# Patient Record
Sex: MALE | Race: WHITE | Employment: OTHER | URBAN - METROPOLITAN AREA
[De-identification: names, ages, dates, MRNs, and addresses within clinical notes are randomized per-mention and may not be internally consistent; named-entity substitution may affect disease eponyms.]

---

## 2017-03-22 ENCOUNTER — OFFICE VISIT (OUTPATIENT)
Dept: CARDIOLOGY CLINIC | Age: 72
End: 2017-03-22

## 2017-03-22 VITALS
DIASTOLIC BLOOD PRESSURE: 80 MMHG | HEART RATE: 60 BPM | WEIGHT: 186 LBS | SYSTOLIC BLOOD PRESSURE: 120 MMHG | BODY MASS INDEX: 29.13 KG/M2

## 2017-03-22 DIAGNOSIS — Z98.61 POST PTCA: ICD-10-CM

## 2017-03-22 DIAGNOSIS — I25.10 CAD IN NATIVE ARTERY: Primary | ICD-10-CM

## 2017-03-22 DIAGNOSIS — I10 ESSENTIAL HYPERTENSION: ICD-10-CM

## 2017-03-22 PROCEDURE — 99214 OFFICE O/P EST MOD 30 MIN: CPT | Performed by: INTERNAL MEDICINE

## 2017-08-30 ENCOUNTER — OFFICE VISIT (OUTPATIENT)
Dept: CARDIOLOGY CLINIC | Age: 72
End: 2017-08-30

## 2017-08-30 VITALS
HEART RATE: 64 BPM | BODY MASS INDEX: 28.91 KG/M2 | WEIGHT: 184.6 LBS | DIASTOLIC BLOOD PRESSURE: 72 MMHG | SYSTOLIC BLOOD PRESSURE: 134 MMHG

## 2017-08-30 DIAGNOSIS — I25.10 CAD IN NATIVE ARTERY: Primary | ICD-10-CM

## 2017-08-30 DIAGNOSIS — I10 ESSENTIAL HYPERTENSION: ICD-10-CM

## 2017-08-30 DIAGNOSIS — Z98.61 POST PTCA: ICD-10-CM

## 2017-08-30 PROCEDURE — 99214 OFFICE O/P EST MOD 30 MIN: CPT | Performed by: INTERNAL MEDICINE

## 2018-06-27 ENCOUNTER — OFFICE VISIT (OUTPATIENT)
Dept: CARDIOLOGY CLINIC | Age: 73
End: 2018-06-27

## 2018-06-27 VITALS
DIASTOLIC BLOOD PRESSURE: 68 MMHG | BODY MASS INDEX: 27.97 KG/M2 | HEART RATE: 64 BPM | WEIGHT: 178.6 LBS | SYSTOLIC BLOOD PRESSURE: 114 MMHG

## 2018-06-27 DIAGNOSIS — I25.10 CAD IN NATIVE ARTERY: Primary | ICD-10-CM

## 2018-06-27 DIAGNOSIS — E78.5 HYPERLIPIDEMIA, UNSPECIFIED HYPERLIPIDEMIA TYPE: ICD-10-CM

## 2018-06-27 DIAGNOSIS — Z98.61 HISTORY OF PTCA: ICD-10-CM

## 2018-06-27 DIAGNOSIS — I10 ESSENTIAL HYPERTENSION: ICD-10-CM

## 2018-06-27 PROCEDURE — 99214 OFFICE O/P EST MOD 30 MIN: CPT | Performed by: INTERNAL MEDICINE

## 2018-12-13 ENCOUNTER — OFFICE VISIT (OUTPATIENT)
Dept: CARDIOLOGY CLINIC | Age: 73
End: 2018-12-13
Payer: MEDICARE

## 2018-12-13 VITALS
SYSTOLIC BLOOD PRESSURE: 138 MMHG | DIASTOLIC BLOOD PRESSURE: 60 MMHG | WEIGHT: 181.8 LBS | BODY MASS INDEX: 28.47 KG/M2 | HEART RATE: 60 BPM

## 2018-12-13 DIAGNOSIS — Z98.61 HISTORY OF PTCA: ICD-10-CM

## 2018-12-13 DIAGNOSIS — E78.5 HYPERLIPIDEMIA, UNSPECIFIED HYPERLIPIDEMIA TYPE: ICD-10-CM

## 2018-12-13 DIAGNOSIS — I10 ESSENTIAL HYPERTENSION: ICD-10-CM

## 2018-12-13 DIAGNOSIS — I25.10 CAD IN NATIVE ARTERY: Primary | ICD-10-CM

## 2018-12-13 PROCEDURE — 99214 OFFICE O/P EST MOD 30 MIN: CPT | Performed by: INTERNAL MEDICINE

## 2018-12-13 NOTE — PROGRESS NOTES
Subjective:      Patient ID: Eliezer Cohen is a 75 y.o. male. HPI: Here for 6 month follow up. Follow up for CAD/PTCA/HTN. Active. Exercising 2x per wt. No exertional sx. No chest pain/no sob. No edema. No pnd. No tachyc/syncope. No pnd or orthopnea. Past Medical History:   Diagnosis Date    CAD (coronary artery disease)     Hyperlipemia     Hypertension     Trigger finger, left Sept 2011    Dr Mario Alberto Bolaños    Type II or unspecified type diabetes mellitus without mention of complication, not stated as uncontrolled      Past Surgical History:   Procedure Laterality Date    CARDIOVASCULAR STRESS TEST  Feb 2009    Dr Rudolph Jenkins  nov 2011    neg, Dr Pattie Faye, EF 65%.  COLONOSCOPY  Aug 2004    Dr Isabel Tesfaye  Feb 2004    Dr Charles Garcia  2002 & 2003   [facility]ERNIA REPAIR  Sept 2001    right side    UPPER GASTROINTESTINAL ENDOSCOPY  Nov 2007    Dr Lawrence Rios       No Known Allergies     Social History     Social History    Marital status:      Spouse name: N/A    Number of children: 2    Years of education: N/A     Occupational History          retired     Social History Main Topics    Smoking status: Former Smoker     Packs/day: 2.00     Years: 12.00     Quit date: 2/28/1965    Smokeless tobacco: Never Used    Alcohol use No    Drug use: No    Sexual activity: Not on file     Other Topics Concern    Not on file     Social History Narrative    No narrative on file        Patient has a family history includes Coronary Art Dis in his father; Diabetes in his brother and mother; Hypertension in his brother and father. Patient  has a past medical history of CAD (coronary artery disease); Hyperlipemia; Hypertension; Trigger finger, left; and Type II or unspecified type diabetes mellitus without mention of complication, not stated as uncontrolled.      Current Outpatient Prescriptions

## 2018-12-26 ENCOUNTER — HOSPITAL ENCOUNTER (OUTPATIENT)
Dept: NON INVASIVE DIAGNOSTICS | Age: 73
Discharge: HOME | End: 2018-12-26
Payer: MEDICARE

## 2018-12-26 ENCOUNTER — HOSPITAL ENCOUNTER (OUTPATIENT)
Dept: NUCLEAR MEDICINE | Age: 73
Discharge: HOME | End: 2018-12-26
Payer: MEDICARE

## 2018-12-26 DIAGNOSIS — I10 ESSENTIAL HYPERTENSION: ICD-10-CM

## 2018-12-26 DIAGNOSIS — Z98.61 HISTORY OF PTCA: ICD-10-CM

## 2018-12-26 DIAGNOSIS — E78.5 HYPERLIPIDEMIA, UNSPECIFIED HYPERLIPIDEMIA TYPE: ICD-10-CM

## 2018-12-26 DIAGNOSIS — I25.10 CAD IN NATIVE ARTERY: ICD-10-CM

## 2018-12-26 LAB
LV EF: 57 %
LVEF MODALITY: NORMAL

## 2018-12-26 PROCEDURE — 93017 CV STRESS TEST TRACING ONLY: CPT

## 2018-12-26 PROCEDURE — A9502 TC99M TETROFOSMIN: HCPCS | Performed by: INTERNAL MEDICINE

## 2018-12-26 PROCEDURE — 2580000003 HC RX 258: Performed by: INTERNAL MEDICINE

## 2018-12-26 PROCEDURE — 3430000000 HC RX DIAGNOSTIC RADIOPHARMACEUTICAL: Performed by: INTERNAL MEDICINE

## 2018-12-26 PROCEDURE — 78452 HT MUSCLE IMAGE SPECT MULT: CPT

## 2018-12-26 RX ORDER — SODIUM CHLORIDE 0.9 % (FLUSH) 0.9 %
10 SYRINGE (ML) INJECTION PRN
Status: DISCONTINUED | OUTPATIENT
Start: 2018-12-26 | End: 2018-12-27 | Stop reason: HOSPADM

## 2018-12-26 RX ADMIN — Medication 10 ML: at 09:34

## 2018-12-26 RX ADMIN — TETROFOSMIN 30 MILLICURIE: 1.38 INJECTION, POWDER, LYOPHILIZED, FOR SOLUTION INTRAVENOUS at 10:51

## 2018-12-26 RX ADMIN — TETROFOSMIN 10 MILLICURIE: 1.38 INJECTION, POWDER, LYOPHILIZED, FOR SOLUTION INTRAVENOUS at 09:34

## 2018-12-26 RX ADMIN — Medication 10 ML: at 10:51

## 2019-06-20 ENCOUNTER — OFFICE VISIT (OUTPATIENT)
Dept: CARDIOLOGY CLINIC | Age: 74
End: 2019-06-20
Payer: MEDICARE

## 2019-06-20 VITALS
DIASTOLIC BLOOD PRESSURE: 70 MMHG | WEIGHT: 188 LBS | HEART RATE: 60 BPM | BODY MASS INDEX: 29.44 KG/M2 | SYSTOLIC BLOOD PRESSURE: 130 MMHG

## 2019-06-20 DIAGNOSIS — Z98.61 HISTORY OF PTCA: ICD-10-CM

## 2019-06-20 DIAGNOSIS — E78.5 HYPERLIPIDEMIA, UNSPECIFIED HYPERLIPIDEMIA TYPE: ICD-10-CM

## 2019-06-20 DIAGNOSIS — I25.10 CAD IN NATIVE ARTERY: Primary | ICD-10-CM

## 2019-06-20 DIAGNOSIS — I10 ESSENTIAL HYPERTENSION: ICD-10-CM

## 2019-06-20 PROCEDURE — 99214 OFFICE O/P EST MOD 30 MIN: CPT | Performed by: INTERNAL MEDICINE

## 2019-06-20 NOTE — PROGRESS NOTES
Subjective:      Patient ID: Charlotte Simms is a 75 y.o. male. HPI: Here for 6 month follow up. Follow up for CAD/PTCA/HTN. Active. No exertional sx. No chest pain/no sob. No edema. No pnd. No tachyc/syncope. No pnd or orthopnea. BP at home good         Past Medical History:   Diagnosis Date    CAD (coronary artery disease)     Hyperlipemia     Hypertension     Trigger finger, left 2011    Dr Orville Handy    Type II or unspecified type diabetes mellitus without mention of complication, not stated as uncontrolled      Past Surgical History:   Procedure Laterality Date    CARDIOVASCULAR STRESS TEST  2009    Dr Digna Woody  2011    neg, Dr Lana Hobbs, EF 65%.     COLONOSCOPY  Aug 2004    Dr Remi Donovan  2004    Dr Danial Vargas   &    [facility] HERNIA REPAIR  2001    right side    UPPER GASTROINTESTINAL ENDOSCOPY  2007    Dr Christopher Cloud       No Known Allergies     Social History     Socioeconomic History    Marital status:      Spouse name: Not on file    Number of children: 2    Years of education: Not on file    Highest education level: Not on file   Occupational History    Occupation: [employer]     Comment: retired   Social Needs    Financial resource strain: Not on file    Food insecurity:     Worry: Not on file     Inability: Not on file   [place] needs:     Medical: Not on file     Non-medical: Not on file   Tobacco Use    Smoking status: Former Smoker     Packs/day: 2.00     Years: 12.00     Pack years: 24.00     Last attempt to quit: 1965     Years since quittin.3    Smokeless tobacco: Never Used   Substance and Sexual Activity    Alcohol use: No    Drug use: No    Sexual activity: Not on file   Lifestyle    Physical activity:     Days per week: Not on file     Minutes per session: Not on file    Stress: Not on file   Relationships    Social connections:     Talks on phone: Not on file     Gets together: Not on file     Attends Jain service: Not on file     Active member of club or organization: Not on file     Attends meetings of clubs or organizations: Not on file     Relationship status: Not on file    Intimate partner violence:     Fear of current or ex partner: Not on file     Emotionally abused: Not on file     Physically abused: Not on file     Forced sexual activity: Not on file   Other Topics Concern    Not on file   Social History Narrative    Not on file        Patient has a family history includes Coronary Art Dis in his father; Diabetes in his brother and mother; Hypertension in his brother and father. Patient  has a past medical history of CAD (coronary artery disease), Hyperlipemia, Hypertension, Trigger finger, left, and Type II or unspecified type diabetes mellitus without mention of complication, not stated as uncontrolled. Current Outpatient Medications   Medication Sig Dispense Refill    Multiple Vitamins-Minerals (ICAPS AREDS 2 PO) Take by mouth 2 times daily      Cholecalciferol (VITAMIN D3) 5000 UNITS TABS Take by mouth      ONETOUCH DELICA LANCETS 90J MISC USE LANCET TO TEST TWICE A DAY AS DIRECTED 200 each 2    ONE TOUCH ULTRA TEST strip USE TO TEST BLOOD SUGAR TWO TIMES A DAY AS DIRECTED 100 strip 3    omeprazole (PRILOSEC) 20 MG capsule Take 20 mg by mouth daily.  metFORMIN (GLUCOPHAGE) 500 MG tablet       tamsulosin (FLOMAX) 0.4 MG capsule       BD PEN NEEDLE MAXINE U/F 32G X 4 MM MISC USE TWICE A  each 3    atorvastatin (LIPITOR) 40 MG tablet Take 1 tablet by mouth daily.  (Patient taking differently: Take 80 mg by mouth daily ) 30 tablet 6    KLOR-CON M20 20 MEQ tablet TAKE ONE TABLET BY MOUTH EVERY DAY 90 tablet 0    amLODIPine (NORVASC) 5 MG tablet TAKE ONE TABLET BY MOUTH EVERY DAY 90 tablet 3    lisinopril-hydrochlorothiazide (PRINZIDE;ZESTORETIC) 20-12.5 MG per tablet TAKE ONE TABLET edema and no tenderness. Neurological: He is alert and oriented to person, place, and time. Skin: Skin is warm and dry. Psychiatric: He has a normal mood and affect. His behavior is normal. Thought content normal.       Assessment:      1. CAD (coronary artery disease)    2. Post PTCA    3. HTN (hypertension)    4. Hyperlipidemia          Plan:      CV stable. Having no angina. BP is good. Wt stable. Remains active. Feeling good. No changes. Reviewed previous records and testing including myoview 12/18. Continue to monitor. Follow up 6 months. Lipids per  PCP.

## 2020-01-08 ENCOUNTER — OFFICE VISIT (OUTPATIENT)
Dept: CARDIOLOGY CLINIC | Age: 75
End: 2020-01-08
Payer: MEDICARE

## 2020-01-08 VITALS
DIASTOLIC BLOOD PRESSURE: 70 MMHG | SYSTOLIC BLOOD PRESSURE: 116 MMHG | BODY MASS INDEX: 29.6 KG/M2 | HEART RATE: 60 BPM | WEIGHT: 189 LBS

## 2020-01-08 PROCEDURE — 99214 OFFICE O/P EST MOD 30 MIN: CPT | Performed by: INTERNAL MEDICINE

## 2020-01-08 RX ORDER — METFORMIN HYDROCHLORIDE 500 MG/1
500 TABLET, EXTENDED RELEASE ORAL 2 TIMES DAILY
COMMUNITY
Start: 2019-12-11

## 2020-01-08 NOTE — PROGRESS NOTES
Not on file     Minutes per session: Not on file    Stress: Not on file   Relationships    Social connections:     Talks on phone: Not on file     Gets together: Not on file     Attends Taoism service: Not on file     Active member of club or organization: Not on file     Attends meetings of clubs or organizations: Not on file     Relationship status: Not on file    Intimate partner violence:     Fear of current or ex partner: Not on file     Emotionally abused: Not on file     Physically abused: Not on file     Forced sexual activity: Not on file   Other Topics Concern    Not on file   Social History Narrative    Not on file        Patient has a family history includes Coronary Art Dis in his father; Diabetes in his brother and mother; Hypertension in his brother and father. Patient  has a past medical history of CAD (coronary artery disease), Hyperlipemia, Hypertension, Trigger finger, left, and Type II or unspecified type diabetes mellitus without mention of complication, not stated as uncontrolled. Current Outpatient Medications   Medication Sig Dispense Refill    metFORMIN (GLUCOPHAGE-XR) 500 MG extended release tablet Take 500 mg by mouth      Cholecalciferol (VITAMIN D3) 5000 UNITS TABS Take by mouth      ONETOUCH DELICA LANCETS 87N MISC USE LANCET TO TEST TWICE A DAY AS DIRECTED 200 each 2    ONE TOUCH ULTRA TEST strip USE TO TEST BLOOD SUGAR TWO TIMES A DAY AS DIRECTED 100 strip 3    omeprazole (PRILOSEC) 20 MG capsule Take 20 mg by mouth daily.  tamsulosin (FLOMAX) 0.4 MG capsule       BD PEN NEEDLE MAXINE U/F 32G X 4 MM MISC USE TWICE A  each 3    atorvastatin (LIPITOR) 40 MG tablet Take 1 tablet by mouth daily.  (Patient taking differently: Take 80 mg by mouth daily ) 30 tablet 6    KLOR-CON M20 20 MEQ tablet TAKE ONE TABLET BY MOUTH EVERY DAY 90 tablet 0    amLODIPine (NORVASC) 5 MG tablet TAKE ONE TABLET BY MOUTH EVERY DAY 90 tablet 3    lisinopril-hydrochlorothiazide (PRINZIDE;ZESTORETIC) 20-12.5 MG per tablet TAKE ONE TABLET BY MOUTH EVERY DAY 90 tablet 3    metoprolol (LOPRESSOR) 25 MG tablet TAKE ONE TABLET BY MOUTH TWICE A  tablet 3    insulin detemir (LEVEMIR FLEXPEN) 100 UNIT/ML injection Inject 40 Units into the skin 2 times daily. 10 Pen 6    aspirin 325 MG tablet Take 81 mg by mouth daily.  Multiple Vitamins-Minerals (ICAPS AREDS 2 PO) Take by mouth 2 times daily      metFORMIN (GLUCOPHAGE) 500 MG tablet       folic acid (FOLVITE) 072 MCG tablet Take 800 mcg by mouth daily. No current facility-administered medications for this visit. Vitals:    01/08/20 1310   BP: 116/70   Pulse: 60       Wt 189      Review of Systems   Constitutional: Negative for activity change and fatigue. Respiratory: Negative for chest tightness. Negative for apnea, cough, choking and shortness of breath. Cardiovascular: Negative for chest pain, palpitations and leg swelling. No PND or orthopnea. No tachycardia. Gastrointestinal: Negative for abdominal distention. Musculoskeletal: Negative for myalgias. Neurological: Negative for dizziness, syncope and light-headedness. Psychiatric/Behavioral: Negative for behavioral problems, confusion and agitation. All other systems reviewed negative as done    Objective:   Physical Exam   Constitutional: He is oriented to person, place, and time. He appears well-developed and well-nourished. No distress. HENT:   Head: Normocephalic and atraumatic. Eyes: Conjunctivae and EOM are normal. Right eye exhibits no discharge. Left eye exhibits no discharge. Neck: Normal range of motion. Neck supple. No JVD present. Cardiovascular: Normal rate, regular rhythm, S1 normal, S2 normal and normal heart sounds. Exam reveals no gallop. No murmur heard. Pulses:       Radial pulses are 2+ on the right side, and 2+ on the left side.    Pulmonary/Chest: Effort normal and breath sounds normal. No respiratory

## 2021-02-10 ENCOUNTER — OFFICE VISIT (OUTPATIENT)
Dept: CARDIOLOGY CLINIC | Age: 76
End: 2021-02-10
Payer: MEDICARE

## 2021-02-10 VITALS
HEART RATE: 54 BPM | HEIGHT: 67 IN | DIASTOLIC BLOOD PRESSURE: 86 MMHG | WEIGHT: 184 LBS | BODY MASS INDEX: 28.88 KG/M2 | SYSTOLIC BLOOD PRESSURE: 144 MMHG | TEMPERATURE: 97.8 F

## 2021-02-10 DIAGNOSIS — I10 ESSENTIAL HYPERTENSION: ICD-10-CM

## 2021-02-10 DIAGNOSIS — E78.5 HYPERLIPIDEMIA, UNSPECIFIED HYPERLIPIDEMIA TYPE: ICD-10-CM

## 2021-02-10 DIAGNOSIS — Z98.61 HISTORY OF PTCA: ICD-10-CM

## 2021-02-10 DIAGNOSIS — I25.10 CAD IN NATIVE ARTERY: Primary | ICD-10-CM

## 2021-02-10 PROCEDURE — 99214 OFFICE O/P EST MOD 30 MIN: CPT | Performed by: INTERNAL MEDICINE

## 2021-02-10 RX ORDER — PANTOPRAZOLE SODIUM 40 MG/1
TABLET, DELAYED RELEASE ORAL
COMMUNITY
Start: 2020-12-11 | End: 2022-08-08 | Stop reason: ALTCHOICE

## 2021-02-10 NOTE — PROGRESS NOTES
Subjective:      Patient ID: Joanna Villegas is a 67 y.o. male. HPI: Here for follow up for CAD/PTCA/HTN. Active in job. [employer]. No exertional sx. No chest pain/no sob. No edema. No pnd. No tachyc/syncope. No pnd or orthopnea. BP at home good         Past Medical History:   Diagnosis Date    CAD (coronary artery disease)     Hyperlipemia     Hypertension     Trigger finger, left 2011    Dr Rosangela Carlin    Type II or unspecified type diabetes mellitus without mention of complication, not stated as uncontrolled      Past Surgical History:   Procedure Laterality Date    CARDIOVASCULAR STRESS TEST  2009    Dr Crow Dickens  2011    neg, Dr Ronan Richardson, EF 65%.     COLONOSCOPY  Aug 2004    Dr Evangelist Bull  2004    Dr Charlee Boucher   &    [facility] HERNIA REPAIR  2001    right side    UPPER GASTROINTESTINAL ENDOSCOPY  2007    Dr Hagan       Allergies   Allergen Reactions    Hydrocodone-Acetaminophen Nausea Only    Oxycodone-Acetaminophen Nausea Only        Social History     Socioeconomic History    Marital status:      Spouse name: Not on file    Number of children: 2    Years of education: Not on file    Highest education level: Not on file   Occupational History    Occupation: [employer]     Comment: retired   Social Needs    Financial resource strain: Not on file    Food insecurity     Worry: Not on file     Inability: Not on file   [employer] needs     Medical: Not on file     Non-medical: Not on file   Tobacco Use    Smoking status: Former Smoker     Packs/day: 2.00     Years: 12.00     Pack years: 24.00     Quit date: 1965     Years since quittin.9    Smokeless tobacco: Never Used   Substance and Sexual Activity    Alcohol use: No    Drug use: No    Sexual activity: Not on file   Lifestyle    Physical activity     Days per week: Not on file Minutes per session: Not on file    Stress: Not on file   Relationships    Social connections     Talks on phone: Not on file     Gets together: Not on file     Attends Gnosticism service: Not on file     Active member of club or organization: Not on file     Attends meetings of clubs or organizations: Not on file     Relationship status: Not on file    Intimate partner violence     Fear of current or ex partner: Not on file     Emotionally abused: Not on file     Physically abused: Not on file     Forced sexual activity: Not on file   Other Topics Concern    Not on file   Social History Narrative    Not on file        Patient has a family history includes Coronary Art Dis in his father; Diabetes in his brother and mother; Hypertension in his brother and father. Patient  has a past medical history of CAD (coronary artery disease), Hyperlipemia, Hypertension, Trigger finger, left, and Type II or unspecified type diabetes mellitus without mention of complication, not stated as uncontrolled. Current Outpatient Medications   Medication Sig Dispense Refill    metFORMIN (GLUCOPHAGE-XR) 500 MG extended release tablet Take 500 mg by mouth      Multiple Vitamins-Minerals (ICAPS AREDS 2 PO) Take by mouth 2 times daily      Cholecalciferol (VITAMIN D3) 5000 UNITS TABS Take by mouth      ONETOUCH DELICA LANCETS 16Y MISC USE LANCET TO TEST TWICE A DAY AS DIRECTED 200 each 2    ONE TOUCH ULTRA TEST strip USE TO TEST BLOOD SUGAR TWO TIMES A DAY AS DIRECTED 100 strip 3    omeprazole (PRILOSEC) 20 MG capsule Take 20 mg by mouth daily.  metFORMIN (GLUCOPHAGE) 500 MG tablet       tamsulosin (FLOMAX) 0.4 MG capsule       BD PEN NEEDLE MAXINE U/F 32G X 4 MM MISC USE TWICE A  each 3    atorvastatin (LIPITOR) 40 MG tablet Take 1 tablet by mouth daily.  (Patient taking differently: Take 80 mg by mouth daily ) 30 tablet 6    KLOR-CON M20 20 MEQ tablet TAKE ONE TABLET BY MOUTH EVERY DAY 90 tablet 0    amLODIPine (NORVASC) 5 MG tablet TAKE ONE TABLET BY MOUTH EVERY DAY 90 tablet 3    lisinopril-hydrochlorothiazide (PRINZIDE;ZESTORETIC) 20-12.5 MG per tablet TAKE ONE TABLET BY MOUTH EVERY DAY 90 tablet 3    metoprolol (LOPRESSOR) 25 MG tablet TAKE ONE TABLET BY MOUTH TWICE A  tablet 3    insulin detemir (LEVEMIR FLEXPEN) 100 UNIT/ML injection Inject 40 Units into the skin 2 times daily. 10 Pen 6    aspirin 325 MG tablet Take 81 mg by mouth daily.  folic acid (FOLVITE) 360 MCG tablet Take 800 mcg by mouth daily.  pantoprazole (PROTONIX) 40 MG tablet        No current facility-administered medications for this visit. Vitals:    02/10/21 1447   BP: (!) 144/86   Pulse:    Temp:        Wt 184      Review of Systems   Constitutional: Negative for activity change and fatigue. Respiratory: Negative for chest tightness. Negative for apnea, cough, choking and shortness of breath. Cardiovascular: Negative for chest pain, palpitations and leg swelling. No PND or orthopnea. No tachycardia. Gastrointestinal: Negative for abdominal distention. Musculoskeletal: Negative for myalgias. Neurological: Negative for dizziness, syncope and light-headedness. Psychiatric/Behavioral: Negative for behavioral problems, confusion and agitation. All other systems reviewed negative as done    Objective:   Physical Exam   Constitutional: He is oriented to person, place, and time. He appears well-developed and well-nourished. No distress. HENT:   Head: Normocephalic and atraumatic. Eyes: Conjunctivae and EOM are normal. Right eye exhibits no discharge. Left eye exhibits no discharge. Neck: Normal range of motion. Neck supple. No JVD present. Cardiovascular: Normal rate, regular rhythm, S1 normal, S2 normal and normal heart sounds. Exam reveals no gallop. No murmur heard. Pulses:       Radial pulses are 2+ on the right side, and 2+ on the left side.    Pulmonary/Chest: Effort normal and breath sounds normal. No respiratory distress. He has no wheezes. He has no rales. Abdominal: Soft. Bowel sounds are normal. He exhibits no distension. No tenderness. Musculoskeletal: Normal range of motion. He exhibits no edema and no tenderness. Neurological: He is alert and oriented to person, place, and time. Skin: Skin is warm and dry. Psychiatric: He has a normal mood and affect. His behavior is normal. Thought content normal.       Assessment:      1. CAD (coronary artery disease)    2. Post PTCA    3. HTN (hypertension)    4. Hyperlipidemia          Plan:      CV stable. Having no angina. BP is good at home. Wt stable. Remains active. Feeling good. No changes. Reviewed previous records and testing including myoview 12/18. Continue to monitor. Follow up 6 months. Lipids per  PCP. Possible stress after next visit if COVID better.

## 2021-08-09 ENCOUNTER — OFFICE VISIT (OUTPATIENT)
Dept: CARDIOLOGY CLINIC | Age: 76
End: 2021-08-09
Payer: MEDICARE

## 2021-08-09 VITALS
DIASTOLIC BLOOD PRESSURE: 66 MMHG | OXYGEN SATURATION: 93 % | BODY MASS INDEX: 28.41 KG/M2 | WEIGHT: 181 LBS | HEART RATE: 56 BPM | SYSTOLIC BLOOD PRESSURE: 136 MMHG | HEIGHT: 67 IN

## 2021-08-09 DIAGNOSIS — Z98.61 HISTORY OF PTCA: ICD-10-CM

## 2021-08-09 DIAGNOSIS — I10 ESSENTIAL HYPERTENSION: ICD-10-CM

## 2021-08-09 DIAGNOSIS — E78.5 HYPERLIPIDEMIA, UNSPECIFIED HYPERLIPIDEMIA TYPE: ICD-10-CM

## 2021-08-09 DIAGNOSIS — I25.10 CAD IN NATIVE ARTERY: Primary | ICD-10-CM

## 2021-08-09 PROCEDURE — 99213 OFFICE O/P EST LOW 20 MIN: CPT | Performed by: INTERNAL MEDICINE

## 2021-08-09 PROCEDURE — G8417 CALC BMI ABV UP PARAM F/U: HCPCS | Performed by: INTERNAL MEDICINE

## 2021-08-09 PROCEDURE — 1036F TOBACCO NON-USER: CPT | Performed by: INTERNAL MEDICINE

## 2021-08-09 PROCEDURE — G8427 DOCREV CUR MEDS BY ELIG CLIN: HCPCS | Performed by: INTERNAL MEDICINE

## 2021-08-09 PROCEDURE — 4040F PNEUMOC VAC/ADMIN/RCVD: CPT | Performed by: INTERNAL MEDICINE

## 2021-08-09 PROCEDURE — 1123F ACP DISCUSS/DSCN MKR DOCD: CPT | Performed by: INTERNAL MEDICINE

## 2021-08-09 NOTE — PROGRESS NOTES
Subjective:      Patient ID: Clarisa Barajas is a 67 y.o. male. HPI: Here for follow up for CAD/PTCA/HTN. Active in job. [employer]. No exertional sx. No chest pain/no sob. No edema. No pnd. No tachyc/syncope. No pnd or orthopnea. BP at home good. Past Medical History:   Diagnosis Date    CAD (coronary artery disease)     Hyperlipemia     Hypertension     Trigger finger, left 2011    Dr Anne Men    Type II or unspecified type diabetes mellitus without mention of complication, not stated as uncontrolled      Past Surgical History:   Procedure Laterality Date    CARDIOVASCULAR STRESS TEST  2009    Dr Arslan Calvin  2011    neg, Dr Aleksey Lawler, EF 65%.     COLONOSCOPY  Aug 2004    Dr Toshia Aguilar  2004    Dr Ana Kessler   &    [facility] HERNIA REPAIR  2001    right side    UPPER GASTROINTESTINAL ENDOSCOPY  2007    Dr Layla Mcleod       Allergies   Allergen Reactions    Hydrocodone-Acetaminophen Nausea Only    Oxycodone-Acetaminophen Nausea Only        Social History     Socioeconomic History    Marital status:      Spouse name: Not on file    Number of children: 2    Years of education: Not on file    Highest education level: Not on file   Occupational History    Occupation: [employer]     Comment: retired   Tobacco Use    Smoking status: Former Smoker     Packs/day: 2.00     Years: 12.00     Pack years: 24.00     Quit date: 1965     Years since quittin.3    Smokeless tobacco: Never Used   Substance and Sexual Activity    Alcohol use: No    Drug use: No    Sexual activity: Not on file   Other Topics Concern    Not on file   Social History Narrative    Not on file     Social Determinants of Health     Financial Resource Strain:     Difficulty of Paying Living Expenses:    Food Insecurity:     Worried About Running Out of Food in the Last Year:     Ran Out of Food in the Last Year:    Transportation Needs:     Lack of Transportation (Medical):  Lack of Transportation (Non-Medical):    Physical Activity:     Days of Exercise per Week:     Minutes of Exercise per Session:    Stress:     Feeling of Stress :    Social Connections:     Frequency of Communication with Friends and Family:     Frequency of Social Gatherings with Friends and Family:     Attends Congregational Services:     Active Member of Clubs or Organizations:     Attends Club or Organization Meetings:     Marital Status:    Intimate Partner Violence:     Fear of Current or Ex-Partner:     Emotionally Abused:     Physically Abused:     Sexually Abused:         Patient has a family history includes Coronary Art Dis in his father; Diabetes in his brother and mother; Hypertension in his brother and father. Patient  has a past medical history of CAD (coronary artery disease), Hyperlipemia, Hypertension, Trigger finger, left, and Type II or unspecified type diabetes mellitus without mention of complication, not stated as uncontrolled. Current Outpatient Medications   Medication Sig Dispense Refill    amLODIPine (NORVASC) 5 MG tablet TAKE ONE TABLET BY MOUTH EVERY DAY 90 tablet 3    pantoprazole (PROTONIX) 40 MG tablet       metFORMIN (GLUCOPHAGE-XR) 500 MG extended release tablet Take 500 mg by mouth      Multiple Vitamins-Minerals (ICAPS AREDS 2 PO) Take by mouth 2 times daily      Cholecalciferol (VITAMIN D3) 5000 UNITS TABS Take by mouth      ONETOUCH DELICA LANCETS 52S MISC USE LANCET TO TEST TWICE A DAY AS DIRECTED 200 each 2    ONE TOUCH ULTRA TEST strip USE TO TEST BLOOD SUGAR TWO TIMES A DAY AS DIRECTED 100 strip 3    omeprazole (PRILOSEC) 20 MG capsule Take 20 mg by mouth daily.       metFORMIN (GLUCOPHAGE) 500 MG tablet       tamsulosin (FLOMAX) 0.4 MG capsule       BD PEN NEEDLE MAXINE U/F 32G X 4 MM MISC USE TWICE A  each 3    atorvastatin (LIPITOR) 40 MG tablet Take 1 tablet by mouth daily. (Patient taking differently: Take 80 mg by mouth daily ) 30 tablet 6    KLOR-CON M20 20 MEQ tablet TAKE ONE TABLET BY MOUTH EVERY DAY 90 tablet 0    lisinopril-hydrochlorothiazide (PRINZIDE;ZESTORETIC) 20-12.5 MG per tablet TAKE ONE TABLET BY MOUTH EVERY DAY 90 tablet 3    metoprolol (LOPRESSOR) 25 MG tablet TAKE ONE TABLET BY MOUTH TWICE A  tablet 3    insulin detemir (LEVEMIR FLEXPEN) 100 UNIT/ML injection Inject 40 Units into the skin 2 times daily. 10 Pen 6    aspirin 325 MG tablet Take 81 mg by mouth daily.  folic acid (FOLVITE) 554 MCG tablet Take 800 mcg by mouth daily. No current facility-administered medications for this visit. Vitals:    08/09/21 1257   BP: 136/66   Pulse: 56   SpO2: 93%       Wt 184      Review of Systems   Constitutional: Negative for activity change and fatigue. Respiratory: Negative for chest tightness. Negative for apnea, cough, choking and shortness of breath. Cardiovascular: Negative for chest pain, palpitations and leg swelling. No PND or orthopnea. No tachycardia. Gastrointestinal: Negative for abdominal distention. Musculoskeletal: Negative for myalgias. Neurological: Negative for dizziness, syncope and light-headedness. Psychiatric/Behavioral: Negative for behavioral problems, confusion and agitation. All other systems reviewed negative as done    Objective:   Physical Exam   Constitutional: He is oriented to person, place, and time. He appears well-developed and well-nourished. No distress. HENT:   Head: Normocephalic and atraumatic. Eyes: Conjunctivae and EOM are normal. Right eye exhibits no discharge. Left eye exhibits no discharge. Neck: Normal range of motion. Neck supple. No JVD present. Cardiovascular: Normal rate, regular rhythm, S1 normal, S2 normal and normal heart sounds. Exam reveals no gallop. No murmur heard.   Pulses:       Radial pulses are 2+ on the right side, and 2+ on the left side. Pulmonary/Chest: Effort normal and breath sounds normal. No respiratory distress. He has no wheezes. He has no rales. Abdominal: Soft. Bowel sounds are normal. He exhibits no distension. No tenderness. Musculoskeletal: Normal range of motion. He exhibits no edema and no tenderness. Neurological: He is alert and oriented to person, place, and time. Skin: Skin is warm and dry. Psychiatric: He has a normal mood and affect. His behavior is normal. Thought content normal.       Assessment:      1. CAD (coronary artery disease)    2. Post PTCA    3. HTN (hypertension)    4. Hyperlipidemia          Plan:      CV stable. Having no angina. BP is good. Wt stable. Remains active. Feeling good. No changes. Reviewed previous records and testing including myoview 12/18. Continue to monitor. Follow up 6 months. Lipids per  PCP. Prefers to follow clinically.

## 2022-02-07 ENCOUNTER — OFFICE VISIT (OUTPATIENT)
Dept: CARDIOLOGY CLINIC | Age: 77
End: 2022-02-07
Payer: MEDICARE

## 2022-02-07 VITALS
DIASTOLIC BLOOD PRESSURE: 60 MMHG | WEIGHT: 172 LBS | HEART RATE: 72 BPM | BODY MASS INDEX: 26.94 KG/M2 | SYSTOLIC BLOOD PRESSURE: 122 MMHG

## 2022-02-07 DIAGNOSIS — Z98.61 HISTORY OF PTCA: ICD-10-CM

## 2022-02-07 DIAGNOSIS — I10 ESSENTIAL HYPERTENSION: ICD-10-CM

## 2022-02-07 DIAGNOSIS — E78.5 HYPERLIPIDEMIA, UNSPECIFIED HYPERLIPIDEMIA TYPE: ICD-10-CM

## 2022-02-07 DIAGNOSIS — I25.10 CAD IN NATIVE ARTERY: Primary | ICD-10-CM

## 2022-02-07 PROCEDURE — 4040F PNEUMOC VAC/ADMIN/RCVD: CPT | Performed by: INTERNAL MEDICINE

## 2022-02-07 PROCEDURE — 99214 OFFICE O/P EST MOD 30 MIN: CPT | Performed by: INTERNAL MEDICINE

## 2022-02-07 PROCEDURE — 1036F TOBACCO NON-USER: CPT | Performed by: INTERNAL MEDICINE

## 2022-02-07 PROCEDURE — G8417 CALC BMI ABV UP PARAM F/U: HCPCS | Performed by: INTERNAL MEDICINE

## 2022-02-07 PROCEDURE — 1123F ACP DISCUSS/DSCN MKR DOCD: CPT | Performed by: INTERNAL MEDICINE

## 2022-02-07 PROCEDURE — G8427 DOCREV CUR MEDS BY ELIG CLIN: HCPCS | Performed by: INTERNAL MEDICINE

## 2022-02-07 PROCEDURE — G8484 FLU IMMUNIZE NO ADMIN: HCPCS | Performed by: INTERNAL MEDICINE

## 2022-02-07 RX ORDER — HYDROCHLOROTHIAZIDE 12.5 MG/1
TABLET ORAL
COMMUNITY
Start: 2021-12-20

## 2022-02-07 RX ORDER — LISINOPRIL 20 MG/1
TABLET ORAL
COMMUNITY
Start: 2021-12-20

## 2022-02-07 NOTE — PROGRESS NOTES
Subjective:      Patient ID: Stevie Helms is a 67 y.o. male. HPI: Here for follow up for CAD/PTCA/HTN. Active in job. [employer]. No exertional sx. No chest pain/no sob. No edema. No pnd. No tachyc/syncope. No pnd or orthopnea. BP at home good. Past Medical History:   Diagnosis Date    CAD (coronary artery disease)     Hyperlipemia     Hypertension     Trigger finger, left 2011    Dr Jeovany Maguire    Type II or unspecified type diabetes mellitus without mention of complication, not stated as uncontrolled      Past Surgical History:   Procedure Laterality Date    CARDIOVASCULAR STRESS TEST  2009    Dr Griselda Calamity  2011    neg, Dr [facility] Sharon Leonardo, EF 65%.     COLONOSCOPY  Aug 2004    Dr Celaya Media  2004    Dr Tressa Acuna   &    [facility] HERNIA REPAIR  2001    right side    UPPER GASTROINTESTINAL ENDOSCOPY  2007    Dr José Miguel Espinoza       Allergies   Allergen Reactions    Hydrocodone-Acetaminophen Nausea Only    Oxycodone-Acetaminophen Nausea Only        Social History     Socioeconomic History    Marital status:      Spouse name: Not on file    Number of children: 2    Years of education: Not on file    Highest education level: Not on file   Occupational History    Occupation: [employer]     Comment: retired   Tobacco Use    Smoking status: Former Smoker     Packs/day: 2.00     Years: 12.00     Pack years: 24.00     Quit date: 1965     Years since quittin.9    Smokeless tobacco: Never Used   Substance and Sexual Activity    Alcohol use: No    Drug use: No    Sexual activity: Not on file   Other Topics Concern    Not on file   Social History Narrative    Not on file     Social Determinants of Health     Financial Resource Strain:     Difficulty of Paying Living Expenses: Not on file   Food Insecurity:     Worried About [address] [employer] in the Last Year: Not on file    Ran Out of Food in the Last Year: Not on file   Transportation Needs:     Lack of Transportation (Medical): Not on file    Lack of Transportation (Non-Medical): Not on file   Physical Activity:     Days of Exercise per Week: Not on file    Minutes of Exercise per Session: Not on file   Stress:     Feeling of Stress : Not on file   Social Connections:     Frequency of Communication with Friends and Family: Not on file    Frequency of Social Gatherings with Friends and Family: Not on file    Attends Congregation Services: Not on file    Active Member of [address] [place] [address] [employer] or Organizations: Not on file    Attends Club or Organization Meetings: Not on file    Marital Status: Not on file   Intimate Partner Violence:     Fear of Current or Ex-Partner: Not on file    Emotionally Abused: Not on file    Physically Abused: Not on file    Sexually Abused: Not on file   Housing Stability:     Unable to Pay for Housing in the Last Year: Not on file    Number of [place] in the Last Year: Not on file    Unstable Housing in the Last Year: Not on file        Patient has a family history includes Coronary Art Dis in his father; Diabetes in his brother and mother; Hypertension in his brother and father. Patient  has a past medical history of CAD (coronary artery disease), Hyperlipemia, Hypertension, Trigger finger, left, and Type II or unspecified type diabetes mellitus without mention of complication, not stated as uncontrolled. Current Outpatient Medications   Medication Sig Dispense Refill    hydroCHLOROthiazide (HYDRODIURIL) 12.5 MG tablet TAKE ONE TABLET BY MOUTH EVERY DAY **TAKE WITH LISINOPRIL 20MG**--TAKE WITH FOOD TO DECREASE GI UPSET--      lisinopril (PRINIVIL;ZESTRIL) 20 MG tablet TAKE ONE TABLET BY MOUTH EVERY DAY **TAKE WITH HYDROCHLOROTHIAZIDE 12. 5MG**      metFORMIN (GLUCOPHAGE-XR) 500 MG extended release tablet Take 500 mg by mouth      ONETOUCH DELICA LANCETS 02W MISC USE LANCET TO TEST TWICE A DAY AS DIRECTED 200 each 2    ONE TOUCH ULTRA TEST strip USE TO TEST BLOOD SUGAR TWO TIMES A DAY AS DIRECTED 100 strip 3    omeprazole (PRILOSEC) 20 MG capsule Take 20 mg by mouth daily.  metFORMIN (GLUCOPHAGE) 500 MG tablet       tamsulosin (FLOMAX) 0.4 MG capsule       BD PEN NEEDLE MAXINE U/F 32G X 4 MM MISC USE TWICE A  each 3    atorvastatin (LIPITOR) 40 MG tablet Take 1 tablet by mouth daily. (Patient taking differently: Take 80 mg by mouth daily ) 30 tablet 6    KLOR-CON M20 20 MEQ tablet TAKE ONE TABLET BY MOUTH EVERY DAY 90 tablet 0    amLODIPine (NORVASC) 5 MG tablet TAKE ONE TABLET BY MOUTH EVERY DAY 90 tablet 3    metoprolol (LOPRESSOR) 25 MG tablet TAKE ONE TABLET BY MOUTH TWICE A  tablet 3    insulin detemir (LEVEMIR FLEXPEN) 100 UNIT/ML injection Inject 40 Units into the skin 2 times daily. 10 Pen 6    aspirin 325 MG tablet Take 81 mg by mouth daily.  pantoprazole (PROTONIX) 40 MG tablet  (Patient not taking: Reported on 2/7/2022)      Multiple Vitamins-Minerals (ICAPS AREDS 2 PO) Take by mouth 2 times daily (Patient not taking: Reported on 2/7/2022)      Cholecalciferol (VITAMIN D3) 5000 UNITS TABS Take by mouth (Patient not taking: Reported on 2/7/2022)      lisinopril-hydrochlorothiazide (PRINZIDE;ZESTORETIC) 20-12.5 MG per tablet TAKE ONE TABLET BY MOUTH EVERY DAY (Patient not taking: Reported on 2/7/2022) 90 tablet 3    folic acid (FOLVITE) 229 MCG tablet Take 800 mcg by mouth daily. No current facility-administered medications for this visit. Vitals:    02/07/22 1305   BP: 122/60   Pulse: 72       Wt 184      Review of Systems   Constitutional: Negative for activity change and fatigue. Respiratory: Negative for chest tightness. Negative for apnea, cough, choking and shortness of breath. Cardiovascular: Negative for chest pain, palpitations and leg swelling. No PND or orthopnea. No tachycardia.

## 2022-08-08 ENCOUNTER — OFFICE VISIT (OUTPATIENT)
Dept: CARDIOLOGY CLINIC | Age: 77
End: 2022-08-08
Payer: MEDICARE

## 2022-08-08 VITALS — OXYGEN SATURATION: 95 % | HEIGHT: 67 IN | HEART RATE: 60 BPM | WEIGHT: 169 LBS | BODY MASS INDEX: 26.53 KG/M2

## 2022-08-08 DIAGNOSIS — Z98.61 HISTORY OF PTCA: ICD-10-CM

## 2022-08-08 DIAGNOSIS — I25.10 CAD IN NATIVE ARTERY: Primary | ICD-10-CM

## 2022-08-08 DIAGNOSIS — I10 ESSENTIAL HYPERTENSION: ICD-10-CM

## 2022-08-08 DIAGNOSIS — E78.5 HYPERLIPIDEMIA, UNSPECIFIED HYPERLIPIDEMIA TYPE: ICD-10-CM

## 2022-08-08 PROCEDURE — 1123F ACP DISCUSS/DSCN MKR DOCD: CPT | Performed by: INTERNAL MEDICINE

## 2022-08-08 PROCEDURE — G8427 DOCREV CUR MEDS BY ELIG CLIN: HCPCS | Performed by: INTERNAL MEDICINE

## 2022-08-08 PROCEDURE — 1036F TOBACCO NON-USER: CPT | Performed by: INTERNAL MEDICINE

## 2022-08-08 PROCEDURE — 99214 OFFICE O/P EST MOD 30 MIN: CPT | Performed by: INTERNAL MEDICINE

## 2022-08-08 PROCEDURE — G8417 CALC BMI ABV UP PARAM F/U: HCPCS | Performed by: INTERNAL MEDICINE

## 2022-08-08 RX ORDER — OMEPRAZOLE 20 MG/1
20 CAPSULE, DELAYED RELEASE ORAL 2 TIMES DAILY
COMMUNITY

## 2022-08-08 NOTE — PROGRESS NOTES
Subjective:      Patient ID: Nasir Roman is a 67 y.o. male. HPI: Here for follow up for CAD/PTCA/HTN. Active in job. [employer]. No exertional sx. No chest pain/no sob. No edema. No pnd. No tachyc/syncope. No pnd or orthopnea. BP at home good. Past Medical History:   Diagnosis Date    CAD (coronary artery disease)     Hyperlipemia     Hypertension     Trigger finger, left 2011    Dr Richard Lobe    Type II or unspecified type diabetes mellitus without mention of complication, not stated as uncontrolled      Past Surgical History:   Procedure Laterality Date    CARDIOVASCULAR STRESS TEST  2009    Dr Vicky Padilla  2011    neg, Dr Wise Sender, EF 65%.     COLONOSCOPY  Aug 2004    Dr Sergio Carr  2004    Dr Kathleen Almaraz   &     INGUINAL HERNIA REPAIR  2001    right side    UPPER GASTROINTESTINAL ENDOSCOPY  2007    Dr Gwendolyn Norris       Allergies   Allergen Reactions    Hydrocodone-Acetaminophen Nausea Only    Oxycodone-Acetaminophen Nausea Only        Social History     Socioeconomic History    Marital status:      Spouse name: Not on file    Number of children: 2    Years of education: Not on file    Highest education level: Not on file   Occupational History    Occupation: [employer]     Comment: retired   Tobacco Use    Smoking status: Former     Packs/day: 2.00     Years: 12.00     Pack years: 24.00     Types: Cigarettes     Quit date: 1965     Years since quittin.4    Smokeless tobacco: Never   Substance and Sexual Activity    Alcohol use: No    Drug use: No    Sexual activity: Not on file   Other Topics Concern    Not on file   Social History Narrative    Not on file     Social Determinants of Health     Financial Resource Strain: Not on file   Food Insecurity: Not on file   Transportation Needs: Not on file   Physical Activity: Not on file   Stress: Not on file   Social Connections: Not on file   Intimate Partner Violence: Not on file   Housing Stability: Not on file        Patient has a family history includes Coronary Art Dis in his father; Diabetes in his brother and mother; Hypertension in his brother and father. Patient  has a past medical history of CAD (coronary artery disease), Hyperlipemia, Hypertension, Trigger finger, left, and Type II or unspecified type diabetes mellitus without mention of complication, not stated as uncontrolled. Current Outpatient Medications   Medication Sig Dispense Refill    hydroCHLOROthiazide (HYDRODIURIL) 12.5 MG tablet TAKE ONE TABLET BY MOUTH EVERY DAY **TAKE WITH LISINOPRIL 20MG**--TAKE WITH FOOD TO DECREASE GI UPSET--      lisinopril (PRINIVIL;ZESTRIL) 20 MG tablet TAKE ONE TABLET BY MOUTH EVERY DAY **TAKE WITH HYDROCHLOROTHIAZIDE 12. 5MG**      pantoprazole (PROTONIX) 40 MG tablet  (Patient not taking: Reported on 2/7/2022)      metFORMIN (GLUCOPHAGE-XR) 500 MG extended release tablet Take 500 mg by mouth      Multiple Vitamins-Minerals (ICAPS AREDS 2 PO) Take by mouth 2 times daily (Patient not taking: Reported on 2/7/2022)      Cholecalciferol (VITAMIN D3) 5000 UNITS TABS Take by mouth (Patient not taking: Reported on 2/7/2022)      ONETOUCH DELICA LANCETS 73M MISC USE LANCET TO TEST TWICE A DAY AS DIRECTED 200 each 2    ONE TOUCH ULTRA TEST strip USE TO TEST BLOOD SUGAR TWO TIMES A DAY AS DIRECTED 100 strip 3    omeprazole (PRILOSEC) 20 MG capsule Take 20 mg by mouth daily. metFORMIN (GLUCOPHAGE) 500 MG tablet       tamsulosin (FLOMAX) 0.4 MG capsule       BD PEN NEEDLE MAXINE U/F 32G X 4 MM MISC USE TWICE A  each 3    atorvastatin (LIPITOR) 40 MG tablet Take 1 tablet by mouth daily.  (Patient taking differently: Take 80 mg by mouth daily ) 30 tablet 6    KLOR-CON M20 20 MEQ tablet TAKE ONE TABLET BY MOUTH EVERY DAY 90 tablet 0    amLODIPine (NORVASC) 5 MG tablet TAKE ONE TABLET BY MOUTH EVERY DAY 90 tablet 3 lisinopril-hydrochlorothiazide (PRINZIDE;ZESTORETIC) 20-12.5 MG per tablet TAKE ONE TABLET BY MOUTH EVERY DAY (Patient not taking: Reported on 2/7/2022) 90 tablet 3    metoprolol (LOPRESSOR) 25 MG tablet TAKE ONE TABLET BY MOUTH TWICE A  tablet 3    insulin detemir (LEVEMIR FLEXPEN) 100 UNIT/ML injection Inject 40 Units into the skin 2 times daily. 10 Pen 6    aspirin 325 MG tablet Take 81 mg by mouth daily. folic acid (FOLVITE) 969 MCG tablet Take 800 mcg by mouth daily. No current facility-administered medications for this visit. Vitals:    08/08/22 1306   Pulse: 60   SpO2: 95%         Wt 184      Review of Systems   Constitutional: Negative for activity change and fatigue. Respiratory: Negative for chest tightness. Negative for apnea, cough, choking and shortness of breath. Cardiovascular: Negative for chest pain, palpitations and leg swelling. No PND or orthopnea. No tachycardia. Gastrointestinal: Negative for abdominal distention. Musculoskeletal: Negative for myalgias. Neck pain. Neurological: Negative for dizziness, syncope and light-headedness. Psychiatric/Behavioral: Negative for behavioral problems, confusion and agitation. All other systems reviewed negative as done    Objective:   Physical Exam   Constitutional: He is oriented to person, place, and time. He appears well-developed and well-nourished. No distress. HENT:   Head: Normocephalic and atraumatic. Eyes: Conjunctivae and EOM are normal. Right eye exhibits no discharge. Left eye exhibits no discharge. Neck: Normal range of motion. Neck supple. No JVD present. Cardiovascular: Normal rate, regular rhythm, S1 normal, S2 normal and normal heart sounds. Exam reveals no gallop. No murmur heard. Pulses:       Radial pulses are 2+ on the right side, and 2+ on the left side. Pulmonary/Chest: Effort normal and breath sounds normal. No respiratory distress. He has no wheezes. He has no rales. Abdominal: Soft. Bowel sounds are normal. He exhibits no distension. No tenderness. Musculoskeletal: Normal range of motion. He exhibits no edema and no tenderness. Neurological: He is alert and oriented to person, place, and time. Skin: Skin is warm and dry. Psychiatric: He has a normal mood and affect. His behavior is normal. Thought content normal.       Assessment:      1. CAD (coronary artery disease)    2. Post PTCA    3. HTN (hypertension)    4. Hyperlipidemia          Plan:      CV stable. Neck sx /pain. Being evaluated. Having no angina. BP is good. Wt stable. Remains active. Feeling good. Will contiinue norvasc 5 mg qd/lipitor 40 mg qd/lopressor 25 mg bid for HTN/CAD. No changes. Reviewed previous records and testing including myoview 12/18. Continue to monitor. Follow up 6 months. Lipids per  PCP. Prefers to follow clinically.

## 2023-02-06 ENCOUNTER — OFFICE VISIT (OUTPATIENT)
Dept: CARDIOLOGY CLINIC | Age: 78
End: 2023-02-06
Payer: MEDICARE

## 2023-02-06 VITALS
OXYGEN SATURATION: 98 % | DIASTOLIC BLOOD PRESSURE: 70 MMHG | WEIGHT: 161 LBS | BODY MASS INDEX: 25.22 KG/M2 | SYSTOLIC BLOOD PRESSURE: 122 MMHG | HEART RATE: 54 BPM

## 2023-02-06 DIAGNOSIS — E78.5 HYPERLIPIDEMIA, UNSPECIFIED HYPERLIPIDEMIA TYPE: ICD-10-CM

## 2023-02-06 DIAGNOSIS — I10 ESSENTIAL HYPERTENSION: ICD-10-CM

## 2023-02-06 DIAGNOSIS — Z98.61 HISTORY OF PTCA: ICD-10-CM

## 2023-02-06 DIAGNOSIS — I25.10 CAD IN NATIVE ARTERY: Primary | ICD-10-CM

## 2023-02-06 PROCEDURE — 1123F ACP DISCUSS/DSCN MKR DOCD: CPT | Performed by: INTERNAL MEDICINE

## 2023-02-06 PROCEDURE — 99214 OFFICE O/P EST MOD 30 MIN: CPT | Performed by: INTERNAL MEDICINE

## 2023-02-06 PROCEDURE — G8427 DOCREV CUR MEDS BY ELIG CLIN: HCPCS | Performed by: INTERNAL MEDICINE

## 2023-02-06 PROCEDURE — G8484 FLU IMMUNIZE NO ADMIN: HCPCS | Performed by: INTERNAL MEDICINE

## 2023-02-06 PROCEDURE — 3078F DIAST BP <80 MM HG: CPT | Performed by: INTERNAL MEDICINE

## 2023-02-06 PROCEDURE — G8417 CALC BMI ABV UP PARAM F/U: HCPCS | Performed by: INTERNAL MEDICINE

## 2023-02-06 PROCEDURE — 3074F SYST BP LT 130 MM HG: CPT | Performed by: INTERNAL MEDICINE

## 2023-02-06 PROCEDURE — 1036F TOBACCO NON-USER: CPT | Performed by: INTERNAL MEDICINE

## 2023-02-06 NOTE — PROGRESS NOTES
Subjective:      Patient ID: Jad Johnson is a 65 y.o. male. HPI: Here for follow up for CAD/PTCA/HTN. Active in job. [employer]. No exertional sx. No chest pain/no sob. No edema. No pnd. No tachyc/syncope. No pnd or orthopnea. BP at home good. Past Medical History:   Diagnosis Date    CAD (coronary artery disease)     Hyperlipemia     Hypertension     Trigger finger, left 2011    Dr El Sharif    Type II or unspecified type diabetes mellitus without mention of complication, not stated as uncontrolled      Past Surgical History:   Procedure Laterality Date    CARDIOVASCULAR STRESS TEST  2009    Dr Cinda So  2011    neg, Dr Lupe Nathan, EF 65%.     COLONOSCOPY  Aug 2004    Dr Grossman Daily  2004    Dr Bry Okeefe   &     INGUINAL HERNIA REPAIR  2001    right side    UPPER GASTROINTESTINAL ENDOSCOPY  2007    Dr Brigida Zamora       Allergies   Allergen Reactions    Hydrocodone-Acetaminophen Nausea Only    Oxycodone-Acetaminophen Nausea Only        Social History     Socioeconomic History    Marital status:      Spouse name: Not on file    Number of children: 2    Years of education: Not on file    Highest education level: Not on file   Occupational History    Occupation: [employer]     Comment: retired   Tobacco Use    Smoking status: Former     Packs/day: 2.00     Years: 12.00     Pack years: 24.00     Types: Cigarettes     Quit date: 1965     Years since quittin.9    Smokeless tobacco: Never   Substance and Sexual Activity    Alcohol use: No    Drug use: No    Sexual activity: Not on file   Other Topics Concern    Not on file   Social History Narrative    Not on file     Social Determinants of Health     Financial Resource Strain: Not on file   Food Insecurity: Not on file   Transportation Needs: Not on file   Physical Activity: Not on file   Stress: Not on file   Social Connections: Not on file   Intimate Partner Violence: Not on file   Housing Stability: Not on file        Patient has a family history includes Coronary Art Dis in his father; Diabetes in his brother and mother; Hypertension in his brother and father. Patient  has a past medical history of CAD (coronary artery disease), Hyperlipemia, Hypertension, Trigger finger, left, and Type II or unspecified type diabetes mellitus without mention of complication, not stated as uncontrolled. Current Outpatient Medications   Medication Sig Dispense Refill    hydroCHLOROthiazide (HYDRODIURIL) 12.5 MG tablet       metFORMIN (GLUCOPHAGE-XR) 500 MG extended release tablet Take 500 mg by mouth in the morning and 500 mg before bedtime. ONETOUCH DELICA LANCETS 40E MISC USE LANCET TO TEST TWICE A DAY AS DIRECTED 200 each 2    ONE TOUCH ULTRA TEST strip USE TO TEST BLOOD SUGAR TWO TIMES A DAY AS DIRECTED 100 strip 3    omeprazole (PRILOSEC) 20 MG capsule Take 20 mg by mouth daily. tamsulosin (FLOMAX) 0.4 MG capsule       BD PEN NEEDLE MAXINE U/F 32G X 4 MM MISC USE TWICE A  each 3    atorvastatin (LIPITOR) 40 MG tablet Take 1 tablet by mouth daily. (Patient taking differently: Take 80 mg by mouth daily) 30 tablet 6    KLOR-CON M20 20 MEQ tablet TAKE ONE TABLET BY MOUTH EVERY DAY 90 tablet 0    amLODIPine (NORVASC) 5 MG tablet TAKE ONE TABLET BY MOUTH EVERY DAY 90 tablet 3    lisinopril-hydrochlorothiazide (PRINZIDE;ZESTORETIC) 20-12.5 MG per tablet TAKE ONE TABLET BY MOUTH EVERY DAY 90 tablet 3    metoprolol (LOPRESSOR) 25 MG tablet TAKE ONE TABLET BY MOUTH TWICE A  tablet 3    insulin detemir (LEVEMIR FLEXPEN) 100 UNIT/ML injection Inject 40 Units into the skin 2 times daily. (Patient taking differently: Inject 20 Units into the skin 2 times daily) 10 Pen 6    aspirin 325 MG tablet Take 81 mg by mouth daily.       lisinopril (PRINIVIL;ZESTRIL) 20 MG tablet TAKE ONE TABLET BY MOUTH EVERY DAY **TAKE WITH HYDROCHLOROTHIAZIDE 12. 5MG** (Patient not taking: Reported on 2/6/2023)      Multiple Vitamins-Minerals (ICAPS AREDS 2 PO) Take by mouth 2 times daily (Patient not taking: No sig reported)      Cholecalciferol (VITAMIN D3) 5000 UNITS TABS Take by mouth (Patient not taking: Reported on 7/2/7728)      folic acid (FOLVITE) 514 MCG tablet Take 800 mcg by mouth daily. (Patient not taking: No sig reported)       No current facility-administered medications for this visit. Vitals:    02/06/23 1321   BP: 122/70   Pulse: 54   SpO2: 98%             Wt 161      Review of Systems   Constitutional: Negative for activity change and fatigue. Respiratory: Negative for chest tightness. Negative for apnea, cough, choking and shortness of breath. Cardiovascular: Negative for chest pain, palpitations and leg swelling. No PND or orthopnea. No tachycardia. Gastrointestinal: Negative for abdominal distention. Musculoskeletal: Negative for myalgias. Neck pain. Neurological: Negative for dizziness, syncope and light-headedness. Psychiatric/Behavioral: Negative for behavioral problems, confusion and agitation. All other systems reviewed negative as done    Objective:   Physical Exam   Constitutional: He is oriented to person, place, and time. He appears well-developed and well-nourished. No distress. HENT:   Head: Normocephalic and atraumatic. Eyes: Conjunctivae and EOM are normal. Right eye exhibits no discharge. Left eye exhibits no discharge. Neck: Normal range of motion. Neck supple. No JVD present. Cardiovascular: Normal rate, regular rhythm, S1 normal, S2 normal and normal heart sounds. Exam reveals no gallop. No murmur heard. Pulses:       Radial pulses are 2+ on the right side, and 2+ on the left side. Pulmonary/Chest: Effort normal and breath sounds normal. No respiratory distress. He has no wheezes. He has no rales. Abdominal: Soft.  Bowel sounds are normal. He exhibits no distension. No tenderness. Musculoskeletal: Normal range of motion. He exhibits no edema and no tenderness. Neurological: He is alert and oriented to person, place, and time. Skin: Skin is warm and dry. Psychiatric: He has a normal mood and affect. His behavior is normal. Thought content normal.       Assessment:      1. CAD (coronary artery disease)    2. Post PTCA    3. HTN (hypertension)    4. Hyperlipidemia          Plan:      CV stable. Having no angina. BP is good. Wt stable. Remains active. Feeling good. Will contiinue norvasc 5 mg qd/lipitor 40 mg qd/lopressor 25 mg bid for HTN/CAD. No changes. Reviewed previous records and testing including myoview 12/18. Continue to monitor. Follow up 6 months. Lipids per  PCP. Prefers to follow clinically.

## 2023-08-07 ENCOUNTER — OFFICE VISIT (OUTPATIENT)
Dept: CARDIOLOGY CLINIC | Age: 78
End: 2023-08-07

## 2023-08-07 VITALS
DIASTOLIC BLOOD PRESSURE: 70 MMHG | WEIGHT: 163 LBS | SYSTOLIC BLOOD PRESSURE: 114 MMHG | BODY MASS INDEX: 25.53 KG/M2 | HEART RATE: 70 BPM

## 2023-08-07 DIAGNOSIS — I10 ESSENTIAL HYPERTENSION: ICD-10-CM

## 2023-08-07 DIAGNOSIS — I25.10 CAD IN NATIVE ARTERY: Primary | ICD-10-CM

## 2023-08-07 DIAGNOSIS — Z98.61 HISTORY OF PTCA: ICD-10-CM

## 2023-08-07 DIAGNOSIS — E78.5 HYPERLIPIDEMIA, UNSPECIFIED HYPERLIPIDEMIA TYPE: ICD-10-CM

## 2023-08-07 RX ORDER — MULTIVITAMIN WITH IRON
1 TABLET ORAL DAILY
COMMUNITY

## 2023-08-07 NOTE — PROGRESS NOTES
Subjective:      Patient ID: Marlin Gonzalez is a 65 y.o. male. HPI: Here for follow up for CAD/PTCA/HTN. Feeling good. Doing well. Active in job. [employer]. No exertional sx. No chest pain/no sob. No edema. No pnd. No tachyc/syncope. No pnd or orthopnea. BP at home good. Past Medical History:   Diagnosis Date    CAD (coronary artery disease)     Hyperlipemia     Hypertension     Trigger finger, left 2011    Dr Nir Kline    Type II or unspecified type diabetes mellitus without mention of complication, not stated as uncontrolled      Past Surgical History:   Procedure Laterality Date    CARDIOVASCULAR STRESS TEST  2009    Dr Eliseo Ibarra  2011    neg, Dr Rodrigo Peña, EF 65%.     COLONOSCOPY  Aug 2004    Dr Elda Mckay  2004    Dr Guicho Varela   &     INGUINAL HERNIA REPAIR  2001    right side    UPPER GASTROINTESTINAL ENDOSCOPY  2007    Dr Karen Becerra       Allergies   Allergen Reactions    Hydrocodone-Acetaminophen Nausea Only    Oxycodone-Acetaminophen Nausea Only        Social History     Socioeconomic History    Marital status:      Spouse name: Not on file    Number of children: 2    Years of education: Not on file    Highest education level: Not on file   Occupational History    Occupation: [employer]     Comment: retired   Tobacco Use    Smoking status: Former     Packs/day: 2.00     Years: 12.00     Pack years: 24.00     Types: Cigarettes     Quit date: 1965     Years since quittin.4    Smokeless tobacco: Never   Substance and Sexual Activity    Alcohol use: No    Drug use: No    Sexual activity: Not on file   Other Topics Concern    Not on file   Social History Narrative    Not on file     Social Determinants of Health     Financial Resource Strain: Not on file   Food Insecurity: Not on file   Transportation Needs: Not on file   Physical Activity: Not on file

## 2024-02-05 ENCOUNTER — OFFICE VISIT (OUTPATIENT)
Dept: CARDIOLOGY CLINIC | Age: 79
End: 2024-02-05
Payer: MEDICARE

## 2024-02-05 VITALS
DIASTOLIC BLOOD PRESSURE: 54 MMHG | SYSTOLIC BLOOD PRESSURE: 117 MMHG | BODY MASS INDEX: 24.12 KG/M2 | WEIGHT: 154 LBS | HEART RATE: 76 BPM

## 2024-02-05 DIAGNOSIS — I25.10 CAD IN NATIVE ARTERY: Primary | ICD-10-CM

## 2024-02-05 DIAGNOSIS — I10 ESSENTIAL HYPERTENSION: ICD-10-CM

## 2024-02-05 DIAGNOSIS — E78.5 HYPERLIPIDEMIA, UNSPECIFIED HYPERLIPIDEMIA TYPE: ICD-10-CM

## 2024-02-05 DIAGNOSIS — Z98.61 HISTORY OF PTCA: ICD-10-CM

## 2024-02-05 PROCEDURE — G8420 CALC BMI NORM PARAMETERS: HCPCS | Performed by: INTERNAL MEDICINE

## 2024-02-05 PROCEDURE — 3078F DIAST BP <80 MM HG: CPT | Performed by: INTERNAL MEDICINE

## 2024-02-05 PROCEDURE — 1123F ACP DISCUSS/DSCN MKR DOCD: CPT | Performed by: INTERNAL MEDICINE

## 2024-02-05 PROCEDURE — 3074F SYST BP LT 130 MM HG: CPT | Performed by: INTERNAL MEDICINE

## 2024-02-05 PROCEDURE — 99214 OFFICE O/P EST MOD 30 MIN: CPT | Performed by: INTERNAL MEDICINE

## 2024-02-05 PROCEDURE — G8427 DOCREV CUR MEDS BY ELIG CLIN: HCPCS | Performed by: INTERNAL MEDICINE

## 2024-02-05 PROCEDURE — 1036F TOBACCO NON-USER: CPT | Performed by: INTERNAL MEDICINE

## 2024-02-05 PROCEDURE — G8484 FLU IMMUNIZE NO ADMIN: HCPCS | Performed by: INTERNAL MEDICINE

## 2024-02-05 NOTE — PROGRESS NOTES
Subjective:      Patient ID: Melvin Marcelo is a 78 y.o. male.    HPI: Here for follow up for CAD/PTCA/HTN.  Feeling good.  Doing well.   Active in job.  [employer]. No exertional sx.  No chest pain/no sob.  No edema.  No pnd.  No tachyc/syncope. No pnd or orthopnea.  BP at home good.        Past Medical History:   Diagnosis Date    CAD (coronary artery disease)     Hyperlipemia     Hypertension     Trigger finger, left 2011    Dr Jack Sales    Type II or unspecified type diabetes mellitus without mention of complication, not stated as uncontrolled      Past Surgical History:   Procedure Laterality Date    CARDIOVASCULAR STRESS TEST  2009    Dr Dillon    CARDIOVASCULAR STRESS TEST  2011    neg, Dr Cool, EF 65%.    COLONOSCOPY  Aug 2004    Dr London    CORONARY ANGIOPLASTY  2004    Dr Dillon    CORONARY ANGIOPLASTY WITH STENT PLACEMENT   &     INGUINAL HERNIA REPAIR  2001    right side    UPPER GASTROINTESTINAL ENDOSCOPY  2007    Dr London       Allergies   Allergen Reactions    Hydrocodone-Acetaminophen Nausea Only    Oxycodone-Acetaminophen Nausea Only        Social History     Socioeconomic History    Marital status:      Spouse name: Not on file    Number of children: 2    Years of education: Not on file    Highest education level: Not on file   Occupational History    Occupation: [employer]     Comment: retired   Tobacco Use    Smoking status: Former     Current packs/day: 0.00     Average packs/day: 2.0 packs/day for 12.0 years (24.0 ttl pk-yrs)     Types: Cigarettes     Start date: 1953     Quit date: 1965     Years since quittin.9    Smokeless tobacco: Never   Substance and Sexual Activity    Alcohol use: No    Drug use: No    Sexual activity: Not on file   Other Topics Concern    Not on file   Social History Narrative    Not on file     Social Determinants of Health     Financial Resource Strain: Not on file   Food Insecurity: Not on file

## 2024-04-29 ENCOUNTER — TELEPHONE (OUTPATIENT)
Dept: CARDIOLOGY CLINIC | Age: 79
End: 2024-04-29

## 2024-04-29 DIAGNOSIS — I25.10 CORONARY ARTERY DISEASE INVOLVING NATIVE CORONARY ARTERY OF NATIVE HEART WITHOUT ANGINA PECTORIS: Primary | ICD-10-CM

## 2024-04-29 DIAGNOSIS — E78.49 OTHER HYPERLIPIDEMIA: ICD-10-CM

## 2024-04-29 DIAGNOSIS — I10 HYPERTENSION, UNSPECIFIED TYPE: ICD-10-CM

## 2024-04-29 NOTE — TELEPHONE ENCOUNTER
Cardiac Risk Assessment  for Procedures and Surgery    What type of procedure are you having: Esophagus surgery    When is your procedure scheduled for: Not scheduled yet but planning for sometime in June    [facility]    Phone Number: [phone]    Fax number to send the letter:       Cardiologist Dr. Dillon    Last Appointment: 2/5/24    Next Appointment: 8/5/24    Are you on any blood thinners:  ASA 325mg                  If yes what?    History of Coronary Artery Disease:    Last Stress test:    Last echo:    Device type and :

## 2024-05-02 NOTE — TELEPHONE ENCOUNTER
Moira from [facility] called to verify that cardiac clearance had been received as well as to update return fax number and to confirm that blood thinners do need to be held for as long as recommended by cardiologist.    Return fax: [phone]

## 2024-05-06 ENCOUNTER — HOSPITAL ENCOUNTER (OUTPATIENT)
Age: 79
Discharge: HOME | End: 2024-05-08
Attending: INTERNAL MEDICINE
Payer: MEDICARE

## 2024-05-06 ENCOUNTER — HOSPITAL ENCOUNTER (OUTPATIENT)
Dept: NUCLEAR MEDICINE | Age: 79
Discharge: HOME | End: 2024-05-06
Attending: INTERNAL MEDICINE
Payer: MEDICARE

## 2024-05-06 DIAGNOSIS — I10 HYPERTENSION, UNSPECIFIED TYPE: ICD-10-CM

## 2024-05-06 DIAGNOSIS — E78.49 OTHER HYPERLIPIDEMIA: ICD-10-CM

## 2024-05-06 DIAGNOSIS — I25.10 CORONARY ARTERY DISEASE INVOLVING NATIVE CORONARY ARTERY OF NATIVE HEART WITHOUT ANGINA PECTORIS: ICD-10-CM

## 2024-05-06 LAB
GLUCOSE BLD-MCNC: 94 MG/DL (ref 70–99)
PERFORMED ON: NORMAL

## 2024-05-06 PROCEDURE — 6360000002 HC RX W HCPCS: Performed by: INTERNAL MEDICINE

## 2024-05-06 PROCEDURE — 3430000000 HC RX DIAGNOSTIC RADIOPHARMACEUTICAL: Performed by: INTERNAL MEDICINE

## 2024-05-06 PROCEDURE — A9502 TC99M TETROFOSMIN: HCPCS | Performed by: INTERNAL MEDICINE

## 2024-05-06 PROCEDURE — 93017 CV STRESS TEST TRACING ONLY: CPT

## 2024-05-06 PROCEDURE — 78452 HT MUSCLE IMAGE SPECT MULT: CPT

## 2024-05-06 RX ORDER — REGADENOSON 0.08 MG/ML
0.4 INJECTION, SOLUTION INTRAVENOUS
Status: COMPLETED | OUTPATIENT
Start: 2024-05-06 | End: 2024-05-06

## 2024-05-06 RX ADMIN — TETROFOSMIN 30 MILLICURIE: 1.38 INJECTION, POWDER, LYOPHILIZED, FOR SOLUTION INTRAVENOUS at 15:15

## 2024-05-06 RX ADMIN — TETROFOSMIN 10 MILLICURIE: 1.38 INJECTION, POWDER, LYOPHILIZED, FOR SOLUTION INTRAVENOUS at 14:15

## 2024-05-06 RX ADMIN — REGADENOSON 0.4 MG: 0.08 INJECTION, SOLUTION INTRAVENOUS at 15:15

## 2024-05-07 LAB
NUC STRESS EJECTION FRACTION: 67 %
STRESS BASELINE DIAS BP: 62 MMHG
STRESS BASELINE HR: 104 BPM
STRESS BASELINE ST DEPRESSION: 0 MM
STRESS BASELINE SYS BP: 97 MMHG
STRESS ESTIMATED WORKLOAD: 1 METS
STRESS PEAK DIAS BP: 71 MMHG
STRESS PEAK SYS BP: 116 MMHG
STRESS PERCENT HR ACHIEVED: 86 %
STRESS POST PEAK HR: 121 BPM
STRESS RATE PRESSURE PRODUCT: NORMAL BPM*MMHG
STRESS TARGET HR: 141 BPM

## 2024-05-07 PROCEDURE — 93018 CV STRESS TEST I&R ONLY: CPT | Performed by: INTERNAL MEDICINE

## 2024-05-07 PROCEDURE — 93016 CV STRESS TEST SUPVJ ONLY: CPT | Performed by: INTERNAL MEDICINE

## 2024-05-07 PROCEDURE — 78452 HT MUSCLE IMAGE SPECT MULT: CPT | Performed by: INTERNAL MEDICINE

## 2024-09-12 ENCOUNTER — OFFICE VISIT (OUTPATIENT)
Dept: CARDIOLOGY CLINIC | Age: 79
End: 2024-09-12
Payer: MEDICARE

## 2024-09-12 VITALS
HEART RATE: 70 BPM | SYSTOLIC BLOOD PRESSURE: 132 MMHG | WEIGHT: 131.8 LBS | DIASTOLIC BLOOD PRESSURE: 80 MMHG | OXYGEN SATURATION: 97 % | BODY MASS INDEX: 20.64 KG/M2

## 2024-09-12 DIAGNOSIS — I10 HYPERTENSION, UNSPECIFIED TYPE: ICD-10-CM

## 2024-09-12 DIAGNOSIS — E78.5 HYPERLIPIDEMIA, UNSPECIFIED HYPERLIPIDEMIA TYPE: ICD-10-CM

## 2024-09-12 DIAGNOSIS — I25.10 CORONARY ARTERY DISEASE INVOLVING NATIVE HEART WITHOUT ANGINA PECTORIS, UNSPECIFIED VESSEL OR LESION TYPE: Primary | ICD-10-CM

## 2024-09-12 PROCEDURE — 3079F DIAST BP 80-89 MM HG: CPT | Performed by: INTERNAL MEDICINE

## 2024-09-12 PROCEDURE — G8420 CALC BMI NORM PARAMETERS: HCPCS | Performed by: INTERNAL MEDICINE

## 2024-09-12 PROCEDURE — 3075F SYST BP GE 130 - 139MM HG: CPT | Performed by: INTERNAL MEDICINE

## 2024-09-12 PROCEDURE — 99214 OFFICE O/P EST MOD 30 MIN: CPT | Performed by: INTERNAL MEDICINE

## 2024-09-12 PROCEDURE — 1123F ACP DISCUSS/DSCN MKR DOCD: CPT | Performed by: INTERNAL MEDICINE

## 2024-09-12 PROCEDURE — 93000 ELECTROCARDIOGRAM COMPLETE: CPT | Performed by: INTERNAL MEDICINE

## 2024-09-12 PROCEDURE — G8427 DOCREV CUR MEDS BY ELIG CLIN: HCPCS | Performed by: INTERNAL MEDICINE

## 2024-09-12 PROCEDURE — 1036F TOBACCO NON-USER: CPT | Performed by: INTERNAL MEDICINE

## 2024-09-12 RX ORDER — ACETAMINOPHEN 325 MG/1
650 TABLET ORAL EVERY 6 HOURS PRN
COMMUNITY

## 2024-09-12 RX ORDER — METOPROLOL SUCCINATE 25 MG/1
25 TABLET, EXTENDED RELEASE ORAL DAILY
Qty: 30 TABLET | Refills: 5
Start: 2024-09-12

## 2025-04-07 ENCOUNTER — OFFICE VISIT (OUTPATIENT)
Dept: CARDIOLOGY CLINIC | Age: 80
End: 2025-04-07
Payer: MEDICARE

## 2025-04-07 VITALS
WEIGHT: 126.6 LBS | DIASTOLIC BLOOD PRESSURE: 72 MMHG | BODY MASS INDEX: 19.83 KG/M2 | OXYGEN SATURATION: 99 % | HEART RATE: 70 BPM | SYSTOLIC BLOOD PRESSURE: 122 MMHG

## 2025-04-07 DIAGNOSIS — I25.10 CORONARY ARTERY DISEASE INVOLVING NATIVE HEART WITHOUT ANGINA PECTORIS, UNSPECIFIED VESSEL OR LESION TYPE: Primary | ICD-10-CM

## 2025-04-07 PROCEDURE — 1159F MED LIST DOCD IN RCRD: CPT | Performed by: INTERNAL MEDICINE

## 2025-04-07 PROCEDURE — 3078F DIAST BP <80 MM HG: CPT | Performed by: INTERNAL MEDICINE

## 2025-04-07 PROCEDURE — G8420 CALC BMI NORM PARAMETERS: HCPCS | Performed by: INTERNAL MEDICINE

## 2025-04-07 PROCEDURE — G2211 COMPLEX E/M VISIT ADD ON: HCPCS | Performed by: INTERNAL MEDICINE

## 2025-04-07 PROCEDURE — 1036F TOBACCO NON-USER: CPT | Performed by: INTERNAL MEDICINE

## 2025-04-07 PROCEDURE — G8427 DOCREV CUR MEDS BY ELIG CLIN: HCPCS | Performed by: INTERNAL MEDICINE

## 2025-04-07 PROCEDURE — 3074F SYST BP LT 130 MM HG: CPT | Performed by: INTERNAL MEDICINE

## 2025-04-07 PROCEDURE — 99213 OFFICE O/P EST LOW 20 MIN: CPT | Performed by: INTERNAL MEDICINE

## 2025-04-07 PROCEDURE — 1123F ACP DISCUSS/DSCN MKR DOCD: CPT | Performed by: INTERNAL MEDICINE

## 2025-04-07 PROCEDURE — 1160F RVW MEDS BY RX/DR IN RCRD: CPT | Performed by: INTERNAL MEDICINE

## 2025-04-07 RX ORDER — FAMOTIDINE 20 MG/1
20 TABLET, FILM COATED ORAL 2 TIMES DAILY
COMMUNITY
Start: 2024-11-15

## 2025-04-07 NOTE — PROGRESS NOTES
[facility]     Outpatient Cardiology         Patient Name:  Melvin Marcelo  Primary Care Physician: Rafaela Jaquez, APRN - CNP  04/07/25     Assessment & Plan    Assessment / Plan:     ASHD status post angioplasty in 2004-last stress test did not show significant ischemia.  Normal LVEF.  No angina.  Not on aspirin and statin.  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  Continue Toprol-XL.  Blood pressure is stable.  Encouraged to increase nutritional intake.  Follow-up in 8 to 9 months            Chief Complaint:     Chief Complaint   Patient presents with    Coronary Artery Disease       History of Present Illness:       HPI     Melvin Marcelo is a 79 y.o. male with PMH of CAD, HLD, HTN, esophageal cancer and DMII here for a follow up.      Today, he reports he is doing well.  No cardiac symptoms.  No leg swelling.  Patient denies any chest pain, shortness of breath, palpitations, presyncope or syncope. No TIA. No claudication. No recent hospitalizations    PMH  Past Medical History:   Diagnosis Date    CAD (coronary artery disease)     Hyperlipemia     Hypertension     Trigger finger, left Sept 2011    Dr Jack Sales    Type II or unspecified type diabetes mellitus without mention of complication, not stated as uncontrolled        PSH  Past Surgical History:   Procedure Laterality Date    CARDIOVASCULAR STRESS TEST  Feb 2009    Dr Dillon    CARDIOVASCULAR STRESS TEST  nov 2011    neg, Dr Cool, EF 65%.    COLONOSCOPY  Aug 2004    Dr London    CORONARY ANGIOPLASTY  Feb 2004    Dr Dillon    CORONARY ANGIOPLASTY WITH STENT PLACEMENT  2002 & 2003    INGUINAL HERNIA REPAIR  Sept 2001    right side    UPPER GASTROINTESTINAL ENDOSCOPY  Nov 2007    Dr London        Social HIstory  Social History     Tobacco Use    Smoking status: Former     Current packs/day: 0.00     Average packs/day: 2.0 packs/day for 12.0 years (24.0 ttl pk-yrs)     Types: Cigarettes 
symptoms during the stress test.    Last Cath (if available):    Last TTE/MIMI 5/2024:    Stress Combined Conclusion: Normal treadmill myocardial perfusion study at 86 %% PHR. Findings suggest a low risk of cardiac events.    Stress Function: Left ventricular function post-stress is normal. Post-stress ejection fraction is 67%. The stress end diastolic cavity size is normal.    Perfusion Comments: LV perfusion is normal. There is no evidence of inducible ischemia.    ECG: Resting ECG demonstrates sinus tachycardia.    ECG: The stress ECG was negative for ischemia.    Stress Test: A pharmacological stress test was performed using regadenoson (Lexiscan). The patient reported no symptoms during the stress test.    Last CMR  (if available):    Last Coronary Artery Calcium Score:           All questions and concerns were addressed to the patient/family. Alternatives to my treatment were discussed. The note was completed using EMR. Every effort was made to ensure accuracy; however, inadvertent computerized transcription errors may be present.    Thank you for allowing me to participate in the care of your patient.    I, Wood Batista MD, personally performed the services prescribed in this documentation as scribed by Ms. Kiet ARAIZA in my presence and it is both accurate and complete.     This note was scribed in the presence of Dr. Lester CARVALHO by Yasmeen Santa RN.         Wood Batista MD  [facility]  [facility] [address], [address], [facility] [address]  Tel   Fax

## 2025-04-07 NOTE — PATIENT INSTRUCTIONS
Thank you for choosing [facility] for your cardiac care.    During your visit today, we reviewed and confirmed your cardiac medications along with  medication prescribed by your other healthcare team members. Please be sure to discuss any  changes to medication with your providers.    Please bring a list of ALL medications (or the bottles) with you to EVERY appointment.  Also include vitamins and over-the-counter medications.    If you need refills for any cardiac medications, please call your pharmacy and they will reach out to us electronically.    Did your provider order testing today? If yes, then you will receive your results in three  possible ways. You can receive a [employer] message, a phone call, or letter in the mail. Please  note, if you are an active [employer] user, some of your testing will be available within 1-2 days.    Finally, please know that it is good for your heart to exercise and follow a healthy, low-fat diet  as advised by your physician and health care providers.    If you are experiencing a medical emergency, please call 911 immediately.    It's easy to register for a [employer] account if you don't already have one. With a [employer]  account you can manage your health record, view test results, schedule appointments and  more.     Dr. Batista's clinical staff can be reached at the following phone number: [phone]    If any cardiac testing is ordered, please contact central scheduling at [phone] to get your test scheduled.